# Patient Record
Sex: FEMALE | Race: WHITE | NOT HISPANIC OR LATINO | Employment: STUDENT | URBAN - METROPOLITAN AREA
[De-identification: names, ages, dates, MRNs, and addresses within clinical notes are randomized per-mention and may not be internally consistent; named-entity substitution may affect disease eponyms.]

---
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
## 2020-12-22 ENCOUNTER — CLINICAL SUPPORT (OUTPATIENT)
Dept: URGENT CARE | Facility: CLINIC | Age: 20
End: 2020-12-22
Payer: COMMERCIAL

## 2020-12-22 DIAGNOSIS — Z11.59 ENCOUNTER FOR SCREENING FOR OTHER VIRAL DISEASES: Primary | ICD-10-CM

## 2020-12-22 LAB
CTP QC/QA: YES
SARS-COV-2 RDRP RESP QL NAA+PROBE: NEGATIVE

## 2020-12-22 PROCEDURE — U0002 COVID-19 LAB TEST NON-CDC: HCPCS | Mod: QW,S$GLB,, | Performed by: PHYSICIAN ASSISTANT

## 2020-12-22 PROCEDURE — U0002: ICD-10-PCS | Mod: QW,S$GLB,, | Performed by: PHYSICIAN ASSISTANT

## 2021-03-26 ENCOUNTER — IMMUNIZATION (OUTPATIENT)
Dept: INTERNAL MEDICINE | Facility: CLINIC | Age: 21
End: 2021-03-26
Payer: COMMERCIAL

## 2021-03-26 DIAGNOSIS — Z23 NEED FOR VACCINATION: Primary | ICD-10-CM

## 2021-03-26 PROCEDURE — 91300 COVID-19, MRNA, LNP-S, PF, 30 MCG/0.3 ML DOSE VACCINE: CPT | Mod: PBBFAC | Performed by: FAMILY MEDICINE

## 2021-04-16 ENCOUNTER — IMMUNIZATION (OUTPATIENT)
Dept: INTERNAL MEDICINE | Facility: CLINIC | Age: 21
End: 2021-04-16
Payer: COMMERCIAL

## 2021-04-16 DIAGNOSIS — Z23 NEED FOR VACCINATION: Primary | ICD-10-CM

## 2021-04-16 PROCEDURE — 0002A COVID-19, MRNA, LNP-S, PF, 30 MCG/0.3 ML DOSE VACCINE: CPT | Mod: PBBFAC | Performed by: FAMILY MEDICINE

## 2021-04-16 PROCEDURE — 91300 COVID-19, MRNA, LNP-S, PF, 30 MCG/0.3 ML DOSE VACCINE: CPT | Mod: PBBFAC | Performed by: FAMILY MEDICINE

## 2024-06-28 ENCOUNTER — LAB VISIT (OUTPATIENT)
Dept: LAB | Facility: HOSPITAL | Age: 24
End: 2024-06-28
Payer: COMMERCIAL

## 2024-06-28 ENCOUNTER — OFFICE VISIT (OUTPATIENT)
Dept: INTERNAL MEDICINE | Facility: CLINIC | Age: 24
End: 2024-06-28
Payer: COMMERCIAL

## 2024-06-28 VITALS
DIASTOLIC BLOOD PRESSURE: 70 MMHG | SYSTOLIC BLOOD PRESSURE: 98 MMHG | OXYGEN SATURATION: 97 % | TEMPERATURE: 99 F | WEIGHT: 177.56 LBS | HEIGHT: 66 IN | HEART RATE: 68 BPM | RESPIRATION RATE: 18 BRPM | BODY MASS INDEX: 28.54 KG/M2

## 2024-06-28 DIAGNOSIS — Z11.59 ENCOUNTER FOR HEPATITIS C SCREENING TEST FOR LOW RISK PATIENT: ICD-10-CM

## 2024-06-28 DIAGNOSIS — Z13.220 SCREENING CHOLESTEROL LEVEL: ICD-10-CM

## 2024-06-28 DIAGNOSIS — Z00.00 ENCOUNTER FOR HEALTH MAINTENANCE EXAMINATION: Primary | ICD-10-CM

## 2024-06-28 DIAGNOSIS — Z11.4 ENCOUNTER FOR SCREENING FOR HIV: ICD-10-CM

## 2024-06-28 DIAGNOSIS — Z01.89 ROUTINE LAB DRAW: ICD-10-CM

## 2024-06-28 DIAGNOSIS — R26.9 GAIT DISTURBANCE: ICD-10-CM

## 2024-06-28 DIAGNOSIS — Z23 NEED FOR TDAP VACCINATION: ICD-10-CM

## 2024-06-28 DIAGNOSIS — Z01.419 PAP TEST, AS PART OF ROUTINE GYNECOLOGICAL EXAMINATION: ICD-10-CM

## 2024-06-28 LAB
ALBUMIN SERPL BCP-MCNC: 3.9 G/DL (ref 3.5–5.2)
ALP SERPL-CCNC: 58 U/L (ref 55–135)
ALT SERPL W/O P-5'-P-CCNC: 23 U/L (ref 10–44)
ANION GAP SERPL CALC-SCNC: 8 MMOL/L (ref 8–16)
AST SERPL-CCNC: 16 U/L (ref 10–40)
BASOPHILS # BLD AUTO: 0.06 K/UL (ref 0–0.2)
BASOPHILS NFR BLD: 0.7 % (ref 0–1.9)
BILIRUB SERPL-MCNC: 0.7 MG/DL (ref 0.1–1)
BUN SERPL-MCNC: 11 MG/DL (ref 6–20)
CALCIUM SERPL-MCNC: 9.6 MG/DL (ref 8.7–10.5)
CHLORIDE SERPL-SCNC: 106 MMOL/L (ref 95–110)
CHOLEST SERPL-MCNC: 161 MG/DL (ref 120–199)
CHOLEST/HDLC SERPL: 3.3 {RATIO} (ref 2–5)
CO2 SERPL-SCNC: 23 MMOL/L (ref 23–29)
CREAT SERPL-MCNC: 0.7 MG/DL (ref 0.5–1.4)
DIFFERENTIAL METHOD BLD: NORMAL
EOSINOPHIL # BLD AUTO: 0.2 K/UL (ref 0–0.5)
EOSINOPHIL NFR BLD: 1.9 % (ref 0–8)
ERYTHROCYTE [DISTWIDTH] IN BLOOD BY AUTOMATED COUNT: 13.6 % (ref 11.5–14.5)
EST. GFR  (NO RACE VARIABLE): >60 ML/MIN/1.73 M^2
GLUCOSE SERPL-MCNC: 83 MG/DL (ref 70–110)
HCT VFR BLD AUTO: 40.3 % (ref 37–48.5)
HCV AB SERPL QL IA: NORMAL
HDLC SERPL-MCNC: 49 MG/DL (ref 40–75)
HDLC SERPL: 30.4 % (ref 20–50)
HGB BLD-MCNC: 13.6 G/DL (ref 12–16)
HIV 1+2 AB+HIV1 P24 AG SERPL QL IA: NORMAL
IMM GRANULOCYTES # BLD AUTO: 0.02 K/UL (ref 0–0.04)
IMM GRANULOCYTES NFR BLD AUTO: 0.2 % (ref 0–0.5)
LDLC SERPL CALC-MCNC: 96.8 MG/DL (ref 63–159)
LYMPHOCYTES # BLD AUTO: 2.2 K/UL (ref 1–4.8)
LYMPHOCYTES NFR BLD: 25.6 % (ref 18–48)
MCH RBC QN AUTO: 29.9 PG (ref 27–31)
MCHC RBC AUTO-ENTMCNC: 33.7 G/DL (ref 32–36)
MCV RBC AUTO: 89 FL (ref 82–98)
MONOCYTES # BLD AUTO: 0.6 K/UL (ref 0.3–1)
MONOCYTES NFR BLD: 7.4 % (ref 4–15)
NEUTROPHILS # BLD AUTO: 5.4 K/UL (ref 1.8–7.7)
NEUTROPHILS NFR BLD: 64.2 % (ref 38–73)
NONHDLC SERPL-MCNC: 112 MG/DL
NRBC BLD-RTO: 0 /100 WBC
PLATELET # BLD AUTO: 256 K/UL (ref 150–450)
PMV BLD AUTO: 11.5 FL (ref 9.2–12.9)
POTASSIUM SERPL-SCNC: 4 MMOL/L (ref 3.5–5.1)
PROT SERPL-MCNC: 7.1 G/DL (ref 6–8.4)
RBC # BLD AUTO: 4.55 M/UL (ref 4–5.4)
SODIUM SERPL-SCNC: 137 MMOL/L (ref 136–145)
TRIGL SERPL-MCNC: 76 MG/DL (ref 30–150)
TSH SERPL DL<=0.005 MIU/L-ACNC: 1.56 UIU/ML (ref 0.4–4)
WBC # BLD AUTO: 8.47 K/UL (ref 3.9–12.7)

## 2024-06-28 PROCEDURE — 80061 LIPID PANEL: CPT | Performed by: NURSE PRACTITIONER

## 2024-06-28 PROCEDURE — 99999 PR PBB SHADOW E&M-NEW PATIENT-LVL V: CPT | Mod: PBBFAC,,, | Performed by: NURSE PRACTITIONER

## 2024-06-28 PROCEDURE — 86803 HEPATITIS C AB TEST: CPT | Performed by: NURSE PRACTITIONER

## 2024-06-28 PROCEDURE — 36415 COLL VENOUS BLD VENIPUNCTURE: CPT | Performed by: NURSE PRACTITIONER

## 2024-06-28 PROCEDURE — 80053 COMPREHEN METABOLIC PANEL: CPT | Performed by: NURSE PRACTITIONER

## 2024-06-28 PROCEDURE — 85025 COMPLETE CBC W/AUTO DIFF WBC: CPT | Performed by: NURSE PRACTITIONER

## 2024-06-28 PROCEDURE — 84443 ASSAY THYROID STIM HORMONE: CPT | Performed by: NURSE PRACTITIONER

## 2024-06-28 PROCEDURE — 87389 HIV-1 AG W/HIV-1&-2 AB AG IA: CPT | Performed by: NURSE PRACTITIONER

## 2024-06-28 NOTE — PROGRESS NOTES
Subjective     Patient ID: Latosha Nagy is a 23 y.o. female.    Chief Complaint: Annual Exam    Pt new to me, here for annual to establish care.    Previous PCP unknown    Review of Systems   Constitutional:  Negative for activity change, appetite change and unexpected weight change.   HENT:  Negative for dental problem and hearing loss.    Eyes:  Negative for visual disturbance.   Respiratory:  Negative for apnea, cough, chest tightness and shortness of breath.    Cardiovascular:  Negative for chest pain, palpitations and leg swelling.   Gastrointestinal:  Negative for abdominal distention, abdominal pain, anal bleeding, blood in stool, constipation, diarrhea, nausea, rectal pain and vomiting.   Endocrine: Negative for cold intolerance, heat intolerance, polydipsia, polyphagia and polyuria.   Genitourinary:  Negative for difficulty urinating, hematuria, menstrual problem, pelvic pain and vaginal pain.   Musculoskeletal:  Positive for gait problem. Negative for arthralgias.        Happened when she was working on her feet long periods of time without supportive shoes. States she use to over twist her foot in and her ankles would buckle and feel weak. Has improved since changing jobs however sometimes her gait is feeling off at times.   Integumentary:  Negative for color change.   Allergic/Immunologic: Negative for environmental allergies, food allergies and immunocompromised state.   Neurological:  Positive for numbness. Negative for dizziness, speech difficulty, weakness, light-headedness and headaches.        Happened when she was working on her feet long periods of time without supportive shoes. States she use to over twist her foot in and her ankles would buckle and feel weak. Has improved since changing jobs however sometimes her gait is feeling off at times.   Hematological:  Negative for adenopathy. Does not bruise/bleed easily.   Psychiatric/Behavioral:  Negative for agitation, behavioral problems, sleep  "disturbance and suicidal ideas.      Review of patient's allergies indicates:  No Known Allergies    No current outpatient medications on file.    There is no problem list on file for this patient.    History reviewed. No pertinent past medical history.    History reviewed. No pertinent surgical history.    Social History     Socioeconomic History    Marital status: Single   Tobacco Use    Smoking status: Never   Substance and Sexual Activity    Alcohol use: No    Drug use: No    Sexual activity: Never     Social Determinants of Health     Financial Resource Strain: Low Risk  (6/28/2024)    Overall Financial Resource Strain (CARDIA)     Difficulty of Paying Living Expenses: Not very hard   Food Insecurity: No Food Insecurity (6/28/2024)    Hunger Vital Sign     Worried About Running Out of Food in the Last Year: Never true     Ran Out of Food in the Last Year: Never true   Physical Activity: Insufficiently Active (6/28/2024)    Exercise Vital Sign     Days of Exercise per Week: 3 days     Minutes of Exercise per Session: 30 min   Stress: No Stress Concern Present (6/28/2024)    [employer] [place] [employer] - Occupational Stress Questionnaire     Feeling of Stress : Only a little   Housing Stability: Unknown (6/28/2024)    Housing Stability Vital Sign     Unable to Pay for Housing in the Last Year: No       Family History   Problem Relation Name Age of Onset    Diabetes Mother Jackie Foy     Hypertension Mother Jackie Foy     Thyroid disease Mother Jackie Foy     Early death Mother Jackie Foy         heart attack - age 55    No Known Problems Father      Breast cancer Neg Hx      Colon cancer Neg Hx      Ovarian cancer Neg Hx         Objective     Vitals:    06/28/24 0833   BP: 98/70   Pulse: 68   Resp: 18   Temp: 98.6 °F (37 °C)   TempSrc: Oral   SpO2: 97%   Weight: 80.6 kg (177 lb 9.3 oz)   Height: 5' 6" (1.676 m)   PainSc: 0-No pain     Body mass index is 28.66 kg/m².    Physical Exam  Vitals " and nursing note reviewed.   Constitutional:       Appearance: Normal appearance. She is well-developed.   HENT:      Head: Normocephalic.      Right Ear: Hearing, tympanic membrane, ear canal and external ear normal.      Left Ear: Hearing, tympanic membrane, ear canal and external ear normal.      Nose: Nose normal.      Mouth/Throat:      Mouth: Mucous membranes are moist.      Pharynx: Oropharynx is clear.   Eyes:      General: Lids are normal. Lids are everted, no foreign bodies appreciated.      Extraocular Movements: Extraocular movements intact.      Conjunctiva/sclera: Conjunctivae normal.      Pupils: Pupils are equal, round, and reactive to light.   Neck:      Vascular: No carotid bruit or JVD.      Trachea: Trachea normal.   Cardiovascular:      Rate and Rhythm: Normal rate and regular rhythm.      Pulses: Normal pulses.      Heart sounds: Normal heart sounds, S1 normal and S2 normal.   Pulmonary:      Effort: Pulmonary effort is normal.      Breath sounds: Normal breath sounds.   Abdominal:      General: Abdomen is flat. Bowel sounds are normal.      Palpations: Abdomen is soft.   Musculoskeletal:         General: Normal range of motion.      Cervical back: Full passive range of motion without pain, normal range of motion and neck supple.   Skin:     General: Skin is warm and dry.      Capillary Refill: Capillary refill takes less than 2 seconds.   Neurological:      General: No focal deficit present.      Mental Status: She is alert and oriented to person, place, and time.      Deep Tendon Reflexes: Reflexes are normal and symmetric.   Psychiatric:         Mood and Affect: Mood normal.         Speech: Speech normal.         Behavior: Behavior normal.         Thought Content: Thought content normal.         Judgment: Judgment normal.            Assessment and Plan     1. Encounter for health maintenance examination  Annual wellness exam completed.    All medications, histories, and concerns reviewed,  reconciled, and addressed.    Appropriate Screenings per pt's sex and age have been reviewed and discussed with pt.    BMI reviewed.    2. Routine lab draw  -     CBC Auto Differential; Future; Expected date: 06/28/2024  -     Comprehensive Metabolic Panel; Future; Expected date: 06/28/2024  -     Lipid Panel; Future; Expected date: 06/28/2024  -     TSH; Future; Expected date: 06/28/2024  -     Hepatitis C Antibody; Future; Expected date: 06/28/2024  -     HIV 1/2 Ag/Ab (4th Gen); Future; Expected date: 06/28/2024    3. Screening cholesterol level  -     Lipid Panel; Future; Expected date: 06/28/2024    4. Encounter for screening for HIV  -     HIV 1/2 Ag/Ab (4th Gen); Future; Expected date: 06/28/2024    5. Encounter for hepatitis C screening test for low risk patient  -     Hepatitis C Antibody; Future; Expected date: 06/28/2024    6. Need for Tdap vaccination  -     DIPH,PERTUSS(ACEL),TET VAC(PF)(ADULT)(ADACEL)(TDaP)    7. Pap test, as part of routine gynecological examination  Has scheduled with Dr. Santoyo     8. BMI 28.0-28.9,adult  BMI reviewed    9. Gait disturbance  -     Ambulatory referral/consult to Neurology; Future; Expected date: 06/28/2024    Fasting lab orders, will call with results, if results ok, RTC in 1 yr for annual or sooner prn with one of MDs I work with who can be your new PCP: Dr. Kyle Shaikh is my practice partner    Keep appt with GYN as scheduled for pap smear    Tdap vaccine discussed    Neurology referral for eval of issue with ankles and walking           Follow up in about 1 year (around 6/28/2025) for annual or sooner as needed with my practice partner Dr. Shaikh.

## 2024-06-28 NOTE — PATIENT INSTRUCTIONS
Fasting lab orders, will call with results, if results ok, RTC in 1 yr for annual or sooner prn with one of MDs I work with who can be your new PCP: Dr. Kyle Shaikh is my practice partner    Keep appt with GYN as scheduled for pap smear    Tdap vaccine discussed    Neurology referral for eval of issue with ankles and walking

## 2025-04-24 ENCOUNTER — TELEPHONE (OUTPATIENT)
Dept: INTERNAL MEDICINE | Facility: CLINIC | Age: 25
End: 2025-04-24
Payer: COMMERCIAL

## 2025-04-24 NOTE — TELEPHONE ENCOUNTER
----- Message from Alla sent at 4/24/2025 11:34 AM CDT -----  Regarding: TIFFANY DREW [[id number]]  Type : Patient Call  Who Called :TIFFANY DREW [[id number]]  Does the patient know what this is regarding?:patient called and stated that she would like to receive a call back in regard to getting a Tb skin test for school, please follow up and advise whether or not this can be provided as patient has been scheduled for tomorrow 04/25/25 at 1:00 pm to also establish care  as her previous primary care  physician and NP are no longer with [facility]. Thanks!  Would the patient rather a call back or a response via [facility]?Call back    Best Call Back Number:[phone]  Additional Information:  ----- Message -----  From: Alla Rizzo  Sent: 4/24/2025  12:01 PM CDT  To: Rolan Bush Staff  Subject: TIFFANY DREW [[id number]]                        Type : Patient Call  Who Called :TIFFANY DREW [[id number]]  Does the patient know what this is regarding?:patient called and stated that she would like to receive a call back in regard to getting a Tb skin test for school, please follow up and advise whether or not this can be provided as patient has been scheduled for tomorrow 04/25/25 at 1:00 pm to also establish care  as her previous primary care  physician and NP is no longer with [facility]. Thanks!  Would the patient rather a call back or a response via [facility]?Call back    Best Call Back Number:[phone]  Additional Information:  ----- Message -----  From: Alla Rizzo  Sent: 4/24/2025  12:01 PM CDT  To: Rolan Bush Staff  Subject: TIFFANY DREW [[id number]]                        Type : Patient Call  Who Called :TIFFANY DREW [[id number]]  Does the patient know what this is regarding?:patient called and stated that she would like to receive a call back in regard to getting a Tb skin test for school, please follow up and advise whether or not this can be provided as patient has been scheduled for tomorrow at 1:00 pm to  also establish care  as her previous primary care  physician and NP is no longer with [facility]. Thanks!  Would the patient rather a call back or a response via [facility]?Call back    Best Call Back Number:[phone]  Additional Information:  ----- Message -----  From: Alla Rizzo  Sent: 4/24/2025  11:58 AM CDT  To: Rolan Bush Staff  Subject: TIFFANY DREW [[id number]]                        Type : Patient Call  Who Called :TIFFANY DREW [[id number]]  Does the patient know what this is regarding?:patient called and stated that she would like to receive a call back in regard to getting a Tb skin test for school, please follow up and advise whether or not this can be provided as patient has been scheduled for tomorrow at 1:00 pm to also establish care  as her previous primary care  physician and NP is no longer with [facility]. Thanks!  Would the patient rather a call back or a response via [facility]?Call back    Best Call Back Number:[phone]  Additional Information:

## 2025-04-25 ENCOUNTER — LAB VISIT (OUTPATIENT)
Dept: LAB | Facility: OTHER | Age: 25
End: 2025-04-25
Attending: INTERNAL MEDICINE
Payer: COMMERCIAL

## 2025-04-25 ENCOUNTER — OFFICE VISIT (OUTPATIENT)
Dept: INTERNAL MEDICINE | Facility: CLINIC | Age: 25
End: 2025-04-25
Payer: COMMERCIAL

## 2025-04-25 VITALS
SYSTOLIC BLOOD PRESSURE: 116 MMHG | DIASTOLIC BLOOD PRESSURE: 59 MMHG | WEIGHT: 187.81 LBS | BODY MASS INDEX: 30.18 KG/M2 | HEART RATE: 79 BPM | HEIGHT: 66 IN

## 2025-04-25 DIAGNOSIS — Z13.6 SCREENING FOR CARDIOVASCULAR CONDITION: ICD-10-CM

## 2025-04-25 DIAGNOSIS — Z13.1 SCREENING FOR DIABETES MELLITUS (DM): ICD-10-CM

## 2025-04-25 DIAGNOSIS — Z11.1 SCREENING FOR TUBERCULOSIS: ICD-10-CM

## 2025-04-25 DIAGNOSIS — E66.811 OBESITY (BMI 30.0-34.9): ICD-10-CM

## 2025-04-25 DIAGNOSIS — Z00.00 ENCOUNTER FOR ROUTINE ADULT HEALTH EXAMINATION WITHOUT ABNORMAL FINDINGS: ICD-10-CM

## 2025-04-25 DIAGNOSIS — Z00.00 ENCOUNTER FOR ROUTINE ADULT HEALTH EXAMINATION WITHOUT ABNORMAL FINDINGS: Primary | ICD-10-CM

## 2025-04-25 LAB
ABSOLUTE EOSINOPHIL (OHS): 0.07 K/UL
ABSOLUTE MONOCYTE (OHS): 0.71 K/UL (ref 0.3–1)
ABSOLUTE NEUTROPHIL COUNT (OHS): 7.57 K/UL (ref 1.8–7.7)
ALBUMIN SERPL BCP-MCNC: 4.2 G/DL (ref 3.5–5.2)
ALP SERPL-CCNC: 74 UNIT/L (ref 40–150)
ALT SERPL W/O P-5'-P-CCNC: 21 UNIT/L (ref 10–44)
ANION GAP (OHS): 10 MMOL/L (ref 8–16)
AST SERPL-CCNC: 17 UNIT/L (ref 11–45)
BASOPHILS # BLD AUTO: 0.06 K/UL
BASOPHILS NFR BLD AUTO: 0.6 %
BILIRUB SERPL-MCNC: 0.4 MG/DL (ref 0.1–1)
BUN SERPL-MCNC: 12 MG/DL (ref 6–20)
CALCIUM SERPL-MCNC: 9.6 MG/DL (ref 8.7–10.5)
CHLORIDE SERPL-SCNC: 107 MMOL/L (ref 95–110)
CHOLEST SERPL-MCNC: 195 MG/DL (ref 120–199)
CHOLEST/HDLC SERPL: 3 {RATIO} (ref 2–5)
CO2 SERPL-SCNC: 23 MMOL/L (ref 23–29)
CREAT SERPL-MCNC: 0.7 MG/DL (ref 0.5–1.4)
EAG (OHS): 100 MG/DL (ref 68–131)
ERYTHROCYTE [DISTWIDTH] IN BLOOD BY AUTOMATED COUNT: 12.5 % (ref 11.5–14.5)
GFR SERPLBLD CREATININE-BSD FMLA CKD-EPI: >60 ML/MIN/1.73/M2
GLUCOSE SERPL-MCNC: 89 MG/DL (ref 70–110)
HBA1C MFR BLD: 5.1 % (ref 4–5.6)
HCT VFR BLD AUTO: 40.3 % (ref 37–48.5)
HDLC SERPL-MCNC: 65 MG/DL (ref 40–75)
HDLC SERPL: 33.3 % (ref 20–50)
HGB BLD-MCNC: 12.6 GM/DL (ref 12–16)
IMM GRANULOCYTES # BLD AUTO: 0.04 K/UL (ref 0–0.04)
IMM GRANULOCYTES NFR BLD AUTO: 0.4 % (ref 0–0.5)
LDLC SERPL CALC-MCNC: 118.6 MG/DL (ref 63–159)
LYMPHOCYTES # BLD AUTO: 1.78 K/UL (ref 1–4.8)
MCH RBC QN AUTO: 26.9 PG (ref 27–31)
MCHC RBC AUTO-ENTMCNC: 31.3 G/DL (ref 32–36)
MCV RBC AUTO: 86 FL (ref 82–98)
NONHDLC SERPL-MCNC: 130 MG/DL
NUCLEATED RBC (/100WBC) (OHS): 0 /100 WBC
PLATELET # BLD AUTO: 325 K/UL (ref 150–450)
PMV BLD AUTO: 11.1 FL (ref 9.2–12.9)
POTASSIUM SERPL-SCNC: 4.8 MMOL/L (ref 3.5–5.1)
PROT SERPL-MCNC: 8 GM/DL (ref 6–8.4)
RBC # BLD AUTO: 4.69 M/UL (ref 4–5.4)
RELATIVE EOSINOPHIL (OHS): 0.7 %
RELATIVE LYMPHOCYTE (OHS): 17.4 % (ref 18–48)
RELATIVE MONOCYTE (OHS): 6.9 % (ref 4–15)
RELATIVE NEUTROPHIL (OHS): 74 % (ref 38–73)
SODIUM SERPL-SCNC: 140 MMOL/L (ref 136–145)
TRIGL SERPL-MCNC: 57 MG/DL (ref 30–150)
WBC # BLD AUTO: 10.23 K/UL (ref 3.9–12.7)

## 2025-04-25 PROCEDURE — 86735 MUMPS ANTIBODY: CPT

## 2025-04-25 PROCEDURE — 80061 LIPID PANEL: CPT

## 2025-04-25 PROCEDURE — 99999 PR PBB SHADOW E&M-EST. PATIENT-LVL III: CPT | Mod: PBBFAC,,, | Performed by: INTERNAL MEDICINE

## 2025-04-25 PROCEDURE — 80053 COMPREHEN METABOLIC PANEL: CPT

## 2025-04-25 PROCEDURE — 86480 TB TEST CELL IMMUN MEASURE: CPT

## 2025-04-25 PROCEDURE — 85025 COMPLETE CBC W/AUTO DIFF WBC: CPT

## 2025-04-25 PROCEDURE — 36415 COLL VENOUS BLD VENIPUNCTURE: CPT

## 2025-04-25 PROCEDURE — 83036 HEMOGLOBIN GLYCOSYLATED A1C: CPT

## 2025-04-25 PROCEDURE — 86787 VARICELLA-ZOSTER ANTIBODY: CPT

## 2025-04-25 NOTE — PROGRESS NOTES
Subjective:      Patient ID: Latosha Nagy is a 24 y.o. female.    Chief Complaint: Establish Care      History of Present Illness    CHIEF COMPLAINT:  Patient presents today to establish care and obtain required documentation for school.    The patient is a 24-year-old  woman presenting to establish primary care, undergo an annual wellness examination, and request varicella and MMR titers, as well as a TB test, for graduate school application requirements.  Objective:  She is a healthy individual who underwent an annual wellness examination in . Laboratory studies at that time--including blood glucose, lipid panel, liver and renal function tests, TSH, WBC, platelet count, and RBC--were all within normal limits.  She is currently single, lives alone, and has no children. She is employed full-time and reports no difficulties at work. She denies experiencing anxiety, depression, stress, or insomnia.  This is her first visit with me. I have reviewed her previous medical records and laboratory results today, which took approximately five minutes.      MEDICAL HISTORY:  Laboratory tests from  of previous year showed normal liver and kidney function, normal cholesterol levels, negative STD screening, normal thyroid function, and no anemia.    IMMUNIZATIONS:  She has received multiple MMR vaccinations with the most recent in  and completed Varicella vaccination with adequate titers. She has not received meningitis vaccination.    FAMILY HISTORY:  Her mother  from a heart attack at an early age and was a smoker. Grandfather had skin cancer. Both grandmother and mother underwent thyroid removal procedures. She denies family history of stroke, lung cancer, colon cancer, or breast cancer.    SOCIAL HISTORY:  She is single and not . She denies smoking but reports occasional alcohol consumption and marijuana use.    MEDICATIONS:  She is not currently taking any prescribed  "medications.    ALLERGIES:  She denies any medication or food allergies.      ROS:  General: -fever, -chills, -fatigue, -weight gain, -weight loss, +increased substance use  Eyes: -vision changes, -redness, -discharge  ENT: -ear pain, -nasal congestion, -sore throat  Cardiovascular: -chest pain, -palpitations, -lower extremity edema  Respiratory: -cough, -shortness of breath  Gastrointestinal: -abdominal pain, -nausea, -vomiting, -diarrhea, -constipation, -blood in stool  Genitourinary: -dysuria, -hematuria, -frequency  Musculoskeletal: -joint pain, -muscle pain  Skin: -rash, -lesion  Neurological: -headache, -dizziness, -numbness, -tingling  Psychiatric: -anxiety, -depression, -sleep difficulty         There are no active problems to display for this patient.       MEDICATIONS:  Current Medications[1]  No current outpatient medications on file.   ALLERGIES:  Review of patient's allergies indicates:  No Known Allergies     History reviewed. No pertinent past medical history.  History reviewed. No pertinent surgical history.  Social History     Social History Narrative    Not on file     Family History   Problem Relation Name Age of Onset    Diabetes Mother Jackie Foy     Hypertension Mother Jackie Foy     Thyroid disease Mother Jackie Foy     Early death Mother Jackie Foy         heart attack - age 55    No Known Problems Father      Breast cancer Neg Hx      Colon cancer Neg Hx      Ovarian cancer Neg Hx         Vitals:    04/25/25 1305   BP: (!) 116/59   Pulse: 79   Weight: 85.2 kg (187 lb 13.3 oz)   Height: 5' 6" (1.676 m)   PainSc: 0-No pain       Review of Systems     No results found for: "HGBA1C"  No results found for: "MICALBCREAT"  Lab Results   Component Value Date    LDLCALC 96.8 06/28/2024    LDLCALC 115.0 10/26/2012    CHOL 161 06/28/2024    HDL 49 06/28/2024    TRIG 76 06/28/2024       Lab Results   Component Value Date     06/28/2024    K 4.0 06/28/2024     06/28/2024    CO2 23 " "06/28/2024    GLU 83 06/28/2024    BUN 11 06/28/2024    CREATININE 0.7 06/28/2024    CALCIUM 9.6 06/28/2024    PROT 7.1 06/28/2024    ALBUMIN 3.9 06/28/2024    BILITOT 0.7 06/28/2024    ALKPHOS 58 06/28/2024    AST 16 06/28/2024    ALT 23 06/28/2024    ANIONGAP 8 06/28/2024    WBC 8.47 06/28/2024    HGB 13.6 06/28/2024    HGB 13.6 10/25/2012    HCT 40.3 06/28/2024    MCV 89 06/28/2024     06/28/2024    TSH 1.556 06/28/2024    HEPCAB Non-reactive 06/28/2024       No results found for: "LH", "FSH", "TOTALTESTOST", "PROGESTERONE", "ESTRADIOL", "RMPDNPSK56HR", "[id number]", "FERRITIN", "IRON", "TRANSFERRIN", "TIBC", "FESATURATED", "ZINC"    Objective:   Physical Exam   Physical Exam    General: No acute distress. Well-developed. Well-nourished.+ obesity  Eyes: EOMI. Sclerae anicteric.  HENT: Normocephalic. Atraumatic. Nares patent. Moist oral mucosa.  Ears: Bilateral TMs clear. Bilateral EACs clear.  Cardiovascular: Regular rate. Regular rhythm. No murmurs. No rubs. No gallops. Normal S1, S2.  Respiratory: Normal respiratory effort. Clear to auscultation bilaterally. No rales. No rhonchi. No wheezing.  Abdomen: Soft. Non-tender. Non-distended. Normoactive bowel sounds.  Musculoskeletal: No  obvious deformity.  Extremities: No lower extremity edema.  Neurological: Alert & oriented x3. No slurred speech. Normal gait.  Psychiatric: Normal mood. Normal affect. Good insight. Good judgment.  Skin: Warm. Dry. No rash.         Assessment:     1. Encounter for routine adult health examination without abnormal findings    2. Obesity (BMI 30.0-34.9)    3. Screening for diabetes mellitus (DM)    4. Screening for cardiovascular condition    5. Screening for tuberculosis      Plan:   Assessment & Plan   Encounter for routine adult health examination without abnormal findings  -     CBC Auto Differential; Future; Expected date: 04/25/2025  -     Comprehensive Metabolic Panel; Future; Expected date: 04/25/2025  -     Lipid " Panel; Future; Expected date: 04/25/2025  -     Hemoglobin A1C; Future; Expected date: 04/25/2025  -     Cancel: Rubella antibody, IgG; Future; Expected date: 04/25/2025  -     Cancel: Rubeola antibody IgG; Future; Expected date: 04/25/2025  -     Mumps, IgG Screen; Future; Expected date: 04/25/2025  -     Varicella zoster antibody, IgG; Future; Expected date: 04/25/2025  -     Measles Antibodies (IgG, IgM); Future; Expected date: 04/25/2025  -     Rubella antibody, IgG; Future; Expected date: 04/25/2025  -     Rubeola antibody IgG; Future; Expected date: 04/25/2025  -     Mumps, IgG Screen; Future; Expected date: 04/25/2025  -     Varicella zoster antibody, IgG; Future; Expected date: 04/25/2025    Obesity (BMI 30.0-34.9)  - educated counseled patient today and advised the patient to have healthy lifestyle modification for overweight reduction such as taking low-calorie diet to do exercise as tolerated daily  Screening for diabetes mellitus (DM)  -     Hemoglobin A1C; Future; Expected date: 04/25/2025    Screening for cardiovascular condition  -     Lipid Panel; Future; Expected date: 04/25/2025    Screening for tuberculosis  -     Quantiferon Gold TB; Future; Expected date: 04/25/2025       Assessment & Plan    E66.3 Overweight  F12.90 Cannabis use, unspecified, uncomplicated  Z28.39 Other underimmunization status  Z82.49 Family history of ischemic heart disease and other diseases of the circulatory system  Z80.8 Family history of malignant neoplasm of other organs or systems  Z83.49 Family history of other endocrine, nutritional and metabolic diseases  Z72.89 Other problems related to lifestyle    IMPRESSION:  Reviewed previous lab work from June 2021, noting normal results for STD screening, thyroid, cholesterol, liver function, renal function, and anemia.  Reviewed family history, noting maternal history of early heart attack and thyroid issues.  Assessed current health status, including weight and lifestyle  factors.    OVERWEIGHT:  - Noted the patient to be overweight.  - Acknowledged the patient's overweight status.  - Encouraged the patient to continue with plans for exercise to address slight overweight status.  - Commended the patient's efforts in working on weight management through exercise.  - Patient to continue with plans for exercise to address slight overweight status.    CANNABIS USE:  - Noted the patient's report of occasional marijuana use.    IMMUNIZATION STATUS:  - Reviewed the patient's vaccination records, noting some vaccinations are up to date while others are missing.  - Evaluated the patient's immunization status, identifying missing meningitis vaccination.  - Assessed the need for titer checks and additional vaccinations.  - Explained the purpose of titer checks to determine antibody levels and vaccine effectiveness.  - Discussed the difference between regular wellness labs and specific titer checks.  - Explained the rationale behind meningitis vaccination requirements for certain situations.  - Ordered labs to check titers for MMR, Varicella, and Hepatitis B.  - Advised the patient to contact their insurance company to inquire about coverage for specialized tests, particularly meningitis titer.  - Ordered tuberculosis blood test (chosen over skin test based on availability and efficiency).  - Instructed to follow up in 72 hours to read TB test results if skin test is performed.  - Planned to administer meningitis vaccination and check titers for other vaccinations.  - Provided prescription for meningitis vaccination to be administered at pharmacy.    FAMILY HISTORY OF HEART DISEASE:  - Noted the patient's report of family history of heart attack.  - Acknowledged family history of heart disease.    FAMILY HISTORY OF SKIN CANCER:  - Noted the patient's report of possible family history of skin cancer.    FAMILY HISTORY OF THYROID ISSUES:  - Noted the patient's report of family history of thyroid  "issues.    LIFESTYLE:  - Noted the patient reports no smoking and infrequent alcohol consumption.         Orders Placed This Encounter    CBC Auto Differential    Comprehensive Metabolic Panel    Lipid Panel    Hemoglobin A1C    Quantiferon Gold TB    Mumps, IgG Screen    Varicella zoster antibody, IgG    Measles Antibodies (IgG, IgM)    Rubella antibody, IgG    Rubeola antibody IgG    Mumps, IgG Screen    Varicella zoster antibody, IgG       Follow up in about 1 week (around 5/2/2025).     There are Patient Instructions on file for this visit.  [unfilled]   All of your core healthy metrics are met.       Health risks of obesity   The Basics   Written by the doctors and editors at [facility], [place]   What is obesity? --   Doctors define obesity based on a person's "body mass index," or "BMI." For adults, weight and height are used to calculate BMI (figure 1).  For people who are White, , or Black:   "Overweight" means a BMI between 25 and 29.9.   "Obesity" means a BMI of 30 or greater.  For people who are , the cutoff numbers are a little different:   "Overweight" means a BMI between 23 and 25.   "Obesity" means a BMI of 25 or greater.  In addition to figuring out your BMI, your doctor might also measure around your belly. This is called "waist circumference." Doctors call it "central obesity" when people carry extra weight in the belly area, even if their BMI is normal.  Obesity increases the risks of many different health problems. It can also make it harder for you to move, breathe, and do other activities.  What are the health risks of obesity? --   Having obesity increases a person's risk of developing many health problems. Here are just a few examples:   Diabetes   High blood pressure   High cholesterol   Heart disease (including heart attacks)   Stroke   Sleep apnea (a disorder that makes you stop breathing for short periods while asleep)   Asthma   Cancer  Does having obesity shorten a person's life? " --   Yes. Studies show that:   People with obesity die younger than people who are a healthy weight.   The risk of death goes up the heavier a person is. The degree of increased risk depends on how long the person has had obesity, and on what other medical problems they have.  People with central obesity (extra weight in the belly area) might also be at risk of dying younger.  Can medical treatments help me lose weight? --   Yes. There are medicines and surgery to help with weight loss. These treatments are meant for people who have not been able to lose weight through lifestyle changes such as diet and exercise.  Weight loss treatments do not take the place of diet and exercise. People who have the treatments must also change how they eat and how active they are.  How can I prevent the problems caused by obesity? --   The best thing you can do is lose weight. But even if weight loss is not possible, you can improve your health and lower your risk if you:   Become more active - Many types of physical activity can help, including walking. You can start with a few minutes a day, and add more as you get stronger and build up your endurance. Anything that gets your body moving is good for you. It is easier to create a habit if you choose activities you enjoy.   Improve your diet - It is healthy to have regular meal times, eat smaller portions, and not skip meals. Limit sweets, and avoid processed foods. Try to eat more vegetables and fruits instead. The best weight loss plans help you have a healthy view of eating.   Quit smoking (if you smoke) - Some people start eating more after they stop smoking, so try to make healthy food choices. Even if it increases your appetite, quitting smoking is still one of the best things you can do to improve your health.   Limit alcohol - For females of any age, limit alcohol to no more than 1 drink a day. For males 64 and younger, limit alcohol to no more than 2 drinks a day. For males  65 and older, limit alcohol to no more than 1 drink a day.  Keeping a diary might help you reach your daily goals. You can use a paper diary or francisco to help you record:   What you eat and drink   Your physical activity   Your weight  What causes obesity? --   Your genes affect your risk of obesity. But lifestyle also has a big impact. You can develop obesity if you eat too much, eat unhealthy foods, move too little, and watch a lot of TV.  There are other things that seem to increase the risk of obesity that you might not know about. Here are some:   Mother's habits during and after pregnancy - People who eat a lot of calories, have diabetes, or smoke during pregnancy have a higher chance of having babies who have obesity as adults. Also, babies who drink formula might be more likely than  babies to develop obesity later in life.   Habits and weight gain during childhood - Children or teens who are overweight or have obesity are more likely to have obesity as an adult.   Sleeping too little - People who do not get enough sleep are more likely to develop obesity.   Taking certain medicines - Long-term use of certain medicines can cause weight gain. If you are concerned that one of your medicines might be making you gain weight, talk to your doctor or nurse. They might be able to switch you to a different medicine.   Certain hormonal conditions - Some hormonal problems can increase the risk of developing obesity. For example, hypothyroidism can cause weight gain, along with other symptoms.  What if I want to get pregnant? --   If you are overweight or have obesity, it might be harder to get pregnant. For males, obesity can also cause sex problems, like having trouble getting or keeping an erection. This is more likely if you also have high blood pressure or diabetes.  What if my child has obesity? --   In children, obesity has many of the same risks as it does in adults. For example, it can increase the risk  of diabetes, high blood pressure, asthma, and sleep apnea. It can also cause added problems related to childhood. For example, obesity can make children grow faster than normal and cause girls to go through puberty earlier than usual.  When should I call the doctor? --   Call your doctor or nurse if you want to try to lose weight. They can help you do it in a healthy way.  It can also help to work with a dietitian (food and nutrition expert). They can help you choose healthy foods and plan meals.  All topics are updated as new evidence becomes available and our peer review process is complete.  This topic retrieved from [employer] on: Jul 13, 2024.  Topic 08184 Version 20.0  Release: 32.6.2 - C32.193  © 2024 UpToDate, Inc. and/or its affiliates. All rights reserved.  figure 1: Your body mass index (BMI)     Find your height (in feet and inches) in the top row. Then, find your weight (in pounds) in the first column. Now, find where the column for your height and the row for your weight meet. That is your BMI. For example, if you are 5-feet-9-inches tall and you weigh 260 pounds, your BMI is 38.  Graphic 72820 Version 4.0  Consumer Information Use and Disclaimer   Disclaimer: This generalized information is a limited summary of diagnosis, treatment, and/or medication information. It is not meant to be comprehensive and should be used as a tool to help the user understand and/or assess potential diagnostic and treatment options. It does NOT include all information about conditions, treatments, medications, side effects, or risks that may apply to a specific patient. It is not intended to be medical advice or a substitute for the medical advice, diagnosis, or treatment of a health care provider based on the health care provider's examination and assessment of a patient's specific and unique circumstances. Patients must speak with a health care provider for complete information about their health, medical questions, and  treatment options, including any risks or benefits regarding use of medications. This information does not endorse any treatments or medications as safe, effective, or approved for treating a specific patient. UpToDate, Inc. and its affiliates disclaim any warranty or liability relating to this information or the use thereof.The use of this information is governed by the Terms of Use, available at https://www.[employer].com/en/know/clinical-effectiveness-terms. 2024© UpToDate, Inc. and its affiliates and/or licensors. All rights reserved.  Copyright   © 2024 UpToDate, Inc. and/or its affiliates. All rights reserved.  Visit Checklist (as applicable):  1. Status of new and prior symptoms discussed? yes  2. Imaging reviewed/ ordered as appropriate? yes  3. Lab study reviewed/ ordered as appropriate? yes  4. Plan for work-up and treatment discussed with patient? yes  5. Potential medication side-effects and monitoring plan discussed? yes  6. Review of outside medical records was performed and pertinent details are summarized in the HPI above? yes     Time spent on this encounter: 35 minutes. This includes face to face time and non-face to face time preparing to see the patient (eg, review of tests), obtaining and/or reviewing separately obtained history, documenting clinical information in the electronic or other health record, independently interpreting results (not separately reported) and communicating results to the patient/family/caregiver, or care coordination (not separately reported). Also patient education regarding chronic and acute medical conditions reviewed. Patient handouts given if appropriate.     Each patient to whom he or she provides medical services by telemedicine is:  (1) informed of the relationship between the physician and patient and the respective role of any other health care provider with respect to management of the patient; and (2) notified that he or she may decline to receive medical  services by telemedicine and may withdraw from such care at any time.     This note was generated with the assistance of ambient listening technology. Verbal consent was obtained by the patient and accompanying visitor(s) for the recording of patient appointment to facilitate this note. I attest to having reviewed and edited the generated note for accuracy, though some syntax or spelling errors may persist. Please contact the author of this note for any clarification.       Eleazar Gongora MD  [facility]                             [1] No current outpatient medications on file.

## 2025-04-26 LAB
MITOGEN MINUS NIL (OHS): 0.42
NIL TB SYNCED (OHS): 0
QUANTIFERON GOLD INTERP (OHS): ABNORMAL
TB1 AG MINUS NIL (OHS): 0
TB2 AG MINUS NIL (OHS): 0

## 2025-04-28 ENCOUNTER — RESULTS FOLLOW-UP (OUTPATIENT)
Dept: INTERNAL MEDICINE | Facility: CLINIC | Age: 25
End: 2025-04-28
Payer: COMMERCIAL

## 2025-04-28 ENCOUNTER — TELEPHONE (OUTPATIENT)
Dept: INTERNAL MEDICINE | Facility: CLINIC | Age: 25
End: 2025-04-28
Payer: COMMERCIAL

## 2025-04-28 LAB
MUMPS IGG (OHS): 59.7 AU/ML
MUMPS IGG INTERPRETATION (OHS): POSITIVE
V.ZOSTER IGG INTERP (OHS): NEGATIVE
VARICELLA ZOSTER IGG (OHS): 0.25 S/CO

## 2025-04-28 NOTE — TELEPHONE ENCOUNTER
----- Message from Eleazar Gongora MD sent at 4/28/2025  9:41 AM CDT -----  Please call patient as following for chest x-ray study to rule out active tuberculosis because she has intermittent abnormal results of tuberculosis test.  Thank you very much for your assistance.  Your laboratory study results results look fine in renal function, liver function, glucose, electrolytes, lipid profile, WBC, platelet, red blood cells with normal levels hemoglobin hematocrit.    However you have intermittent abnormal results of tuberculosis test, suggesting that you need to further chest x-ray study to evaluation underlying tuberculosis.  I have ordered chest x-ray study for   your already and please call radiology department for the appointment.  Please feel free to contact us for any questions or concerns.  You do not require any change in current treatment.     Please contact me if you have any additional concerns.    Sincerely,    Eleazar Gongora  ----- Message -----  From: Lab, Background User  Sent: 4/25/2025   2:44 PM CDT  To: Eleazar Gongora MD

## 2025-04-28 NOTE — TELEPHONE ENCOUNTER
Spoke with the patient have her schedule for the Chest Xray on tomorrow @0015 and will see Dr. Gongora @[id number]. Also she was ok with the results from the Blood work and still have question about the Xray.

## 2025-04-29 ENCOUNTER — TELEPHONE (OUTPATIENT)
Dept: INTERNAL MEDICINE | Facility: CLINIC | Age: 25
End: 2025-04-29
Payer: COMMERCIAL

## 2025-04-29 ENCOUNTER — HOSPITAL ENCOUNTER (OUTPATIENT)
Dept: RADIOLOGY | Facility: OTHER | Age: 25
Discharge: HOME | End: 2025-04-29
Attending: INTERNAL MEDICINE
Payer: COMMERCIAL

## 2025-04-29 DIAGNOSIS — Z91.89 AT RISK FOR TUBERCULOSIS: ICD-10-CM

## 2025-04-29 DIAGNOSIS — Z91.89 AT HIGH RISK FOR TUBERCULOSIS INFECTION: Primary | ICD-10-CM

## 2025-04-29 PROCEDURE — 71046 X-RAY EXAM CHEST 2 VIEWS: CPT | Mod: TC,FY

## 2025-04-29 PROCEDURE — 71046 X-RAY EXAM CHEST 2 VIEWS: CPT | Mod: 26,,, | Performed by: RADIOLOGY

## 2025-04-30 ENCOUNTER — OFFICE VISIT (OUTPATIENT)
Dept: INTERNAL MEDICINE | Facility: CLINIC | Age: 25
End: 2025-04-30
Payer: COMMERCIAL

## 2025-04-30 VITALS
WEIGHT: 190.25 LBS | OXYGEN SATURATION: 98 % | SYSTOLIC BLOOD PRESSURE: 98 MMHG | DIASTOLIC BLOOD PRESSURE: 57 MMHG | BODY MASS INDEX: 30.58 KG/M2 | HEIGHT: 66 IN | HEART RATE: 77 BPM

## 2025-04-30 DIAGNOSIS — E66.811 OBESITY (BMI 30.0-34.9): ICD-10-CM

## 2025-04-30 DIAGNOSIS — J30.1 SEASONAL ALLERGIC RHINITIS DUE TO POLLEN: Primary | ICD-10-CM

## 2025-04-30 DIAGNOSIS — R03.1 BORDERLINE LOW BLOOD PRESSURE DETERMINED BY EXAMINATION: ICD-10-CM

## 2025-04-30 PROCEDURE — 99999 PR PBB SHADOW E&M-EST. PATIENT-LVL III: CPT | Mod: PBBFAC,,, | Performed by: INTERNAL MEDICINE

## 2025-04-30 NOTE — PROGRESS NOTES
Subjective:      Patient ID: Latosha Nagy is a 24 y.o. female.    Chief Complaint: Follow-up (Need forms filled out for school. Cxr and labs f/u)      History of Present Illness    CHIEF COMPLAINT:  Patient presents today for follow up of test results    The patient is a 24-year-old  woman who presented for follow-up regarding her annual wellness examination, laboratory results, and documentation required for her graduate school application.  Testing and Immunization Status:  Tuberculosis screening: Quantiferon Gold TB test returned abnormal; however, follow-up chest X-ray was normal.  MMR and Varicella titers:  Mumps antibody is present, indicating immunity from prior vaccination.  Varicella (chickenpox) antibody is also present.  Measles titer is currently pending.  Annual Laboratory Results:  Routine health labs, including diabetes screening, lipid panel, complete blood count, electrolytes, nutritional markers, and liver and kidney function tests, were all within normal limits.  Current Symptoms:  The patient reports a runny nose and dry cough without sputum, wheezing, or shortness of breath. She has a known history of seasonal allergies, which may explain current symptoms.  Plan:  TB forms and general health forms required by her graduate school were completed and provided today.  Will complete the remaining documentation for measles immunity once the pending titer result becomes available.        ALLERGIES:  She denies taking any allergy medications.      ROS:  General: -fever, -chills, -fatigue, -weight gain, -weight loss  Eyes: -vision changes, -redness, -discharge  ENT: -ear pain, -nasal congestion, -sore throat  Cardiovascular: -chest pain, -palpitations, -lower extremity edema  Respiratory: -cough, -shortness of breath  Gastrointestinal: -abdominal pain, -nausea, -vomiting, -diarrhea, -constipation, -blood in stool  Genitourinary: -dysuria, -hematuria, -frequency  Musculoskeletal: -joint pain,  "-muscle pain  Skin: -rash, -lesion, +acne  Neurological: -headache, -dizziness, -numbness, -tingling  Psychiatric: -anxiety, -depression, -sleep difficulty  Allergic: +frequent sneezing, +allergic reactions         There are no active problems to display for this patient.       MEDICATIONS:  Current Medications[1]  No current outpatient medications on file.   ALLERGIES:  Review of patient's allergies indicates:  No Known Allergies     History reviewed. No pertinent past medical history.  History reviewed. No pertinent surgical history.  Social History     Social History Narrative    Not on file     Family History   Problem Relation Name Age of Onset    Diabetes Mother Jackie Foy     Hypertension Mother Jackie Foy     Thyroid disease Mother Jackie Foy     Early death Mother Jackie Foy         heart attack - age 55    No Known Problems Father      Breast cancer Neg Hx      Colon cancer Neg Hx      Ovarian cancer Neg Hx         Vitals:    04/30/25 1003   BP: (!) 98/57   Pulse: 77   SpO2: 98%   Weight: 86.3 kg (190 lb 4.1 oz)   Height: 5' 6" (1.676 m)   PainSc: 0-No pain       Review of Systems     Lab Results   Component Value Date    HGBA1C 5.1 04/25/2025     No results found for: "MICALBCREAT"  Lab Results   Component Value Date    LDLCALC 118.6 04/25/2025    LDLCALC 96.8 06/28/2024    CHOL 195 04/25/2025    HDL 65 04/25/2025    TRIG 57 04/25/2025       Lab Results   Component Value Date     04/25/2025    K 4.8 04/25/2025     04/25/2025    CO2 23 04/25/2025    GLU 89 04/25/2025    BUN 12 04/25/2025    CREATININE 0.7 04/25/2025    CALCIUM 9.6 04/25/2025    PROT 8.0 04/25/2025    ALBUMIN 4.2 04/25/2025    BILITOT 0.4 04/25/2025    ALKPHOS 74 04/25/2025    AST 17 04/25/2025    ALT 21 04/25/2025    ANIONGAP 10 04/25/2025    WBC 10.23 04/25/2025    HGB 12.6 04/25/2025    HGB 13.6 06/28/2024    HCT 40.3 04/25/2025    MCV 86 04/25/2025     04/25/2025    TSH 1.556 06/28/2024    HEPCAB Non-reactive " "06/28/2024       No results found for: "LH", "FSH", "TOTALTESTOST", "PROGESTERONE", "ESTRADIOL", "XMLEDEEY12AH", "SBYSIVSM04", "FERRITIN", "IRON", "TRANSFERRIN", "TIBC", "FESATURATED", "ZINC"    Objective:   Physical Exam   Physical Exam    General: No acute distress. Well-developed. Well-nourished.+ obesity  Eyes: EOMI. Sclerae anicteric.  HENT: Normocephalic. Atraumatic. Nares patent. Moist oral mucosa.  Ears: Bilateral TMs clear. Bilateral EACs clear.  Cardiovascular: Regular rate. Regular rhythm. No murmurs. No rubs. No gallops. Normal S1, S2.  Respiratory: Normal respiratory effort. Clear to auscultation bilaterally. No rales. No rhonchi. No wheezing.  Abdomen: Soft. Non-tender. Non-distended. Normoactive bowel sounds.  Musculoskeletal: No  obvious deformity.  Extremities: No lower extremity edema.  Neurological: Alert & oriented x3. No slurred speech. Normal gait.  Psychiatric: Normal mood. Normal affect. Good insight. Good judgment.  Skin: Warm. Dry. No rash.         Assessment:     1. Seasonal allergic rhinitis due to pollen    2. Obesity (BMI 30.0-34.9)    3. Borderline low blood pressure determined by examination      Plan:   Assessment & Plan   Seasonal allergic rhinitis due to pollen  - educated counseled patient today advised the patient take Zyrtec or Allegra 1 tablets daily as needed  Obesity (BMI 30.0-34.9)  - educated and counseled patient today for healthy lifestyle modification taking low calorie diet and to do exercise daily as tolerated for overweight reduction  Borderline low blood pressure determined by examination  - today her blood pressure level of 98/57 with pulse rate 77 per minutes.  - asymptomatic  - no further evaluation management necessary     Assessment & Plan    R76.11 Nonspecific reaction to tuberculin skin test without active tuberculosis  J30.9 Allergic rhinitis, unspecified    IMPRESSION:  Reviewed lab results, including titers for various diseases and " "vaccinations.  Interpreted TB test as abnormal, but chest XR showed no active TB infection.  Assessed overall health status based on CMP, noting normal liver and renal function, electrolytes, and absence of diabetes or anemia.  Discussed the clinical insignificance of slight variations in cell size noted in lab results.  Completed tuberculosis screening form based on test results and chest XR.    TUBERCULOSIS (TB) SCREENING AND MANAGEMENT:  - Patient's blood test for TB came back positive, indicating past infection but no current active disease.  - X-ray was performed and results showed no acute problems, confirming absence of active tuberculosis.  - Will complete tuberculosis form for the patient.    ALLERGIC RHINITIS:  - Noted recent onset of allergy symptoms including drainage and sneezing.  - Recommend OTC Zyrtec to be taken as needed for allergy relief.  - Discussed possibility of prescribing Flonase nasal spray if symptoms become severe.    FOLLOW-UP AND OTHER:  - Follow up annually, likely after summer.              Follow up in about 1 year (around 4/30/2026).     There are Patient Instructions on file for this visit.  [unfilled]   All of your core healthy metrics are met.    Patient Education     Health risks of obesity   The Basics   Written by the doctors and editors at [facility]   What is obesity? --   Doctors define obesity based on a person's "body mass index," or "BMI." For adults, weight and height are used to calculate BMI (figure 1).  For people who are White, , or Black:   "Overweight" means a BMI between 25 and 29.9.   "Obesity" means a BMI of 30 or greater.  For people who are , the cutoff numbers are a little different:   "Overweight" means a BMI between 23 and 25.   "Obesity" means a BMI of 25 or greater.  In addition to figuring out your BMI, your doctor might also measure around your belly. This is called "waist circumference." Doctors call it "central obesity" when people " carry extra weight in the belly area, even if their BMI is normal.  Obesity increases the risks of many different health problems. It can also make it harder for you to move, breathe, and do other activities.  What are the health risks of obesity? --   Having obesity increases a person's risk of developing many health problems. Here are just a few examples:   Diabetes   High blood pressure   High cholesterol   Heart disease (including heart attacks)   Stroke   Sleep apnea (a disorder that makes you stop breathing for short periods while asleep)   Asthma   Cancer  Does having obesity shorten a person's life? --   Yes. Studies show that:   People with obesity die younger than people who are a healthy weight.   The risk of death goes up the heavier a person is. The degree of increased risk depends on how long the person has had obesity, and on what other medical problems they have.  People with central obesity (extra weight in the belly area) might also be at risk of dying younger.  Can medical treatments help me lose weight? --   Yes. There are medicines and surgery to help with weight loss. These treatments are meant for people who have not been able to lose weight through lifestyle changes such as diet and exercise.  Weight loss treatments do not take the place of diet and exercise. People who have the treatments must also change how they eat and how active they are.  How can I prevent the problems caused by obesity? --   The best thing you can do is lose weight. But even if weight loss is not possible, you can improve your health and lower your risk if you:   Become more active - Many types of physical activity can help, including walking. You can start with a few minutes a day, and add more as you get stronger and build up your endurance. Anything that gets your body moving is good for you. It is easier to create a habit if you choose activities you enjoy.   Improve your diet - It is healthy to have regular meal  times, eat smaller portions, and not skip meals. Limit sweets, and avoid processed foods. Try to eat more vegetables and fruits instead. The best weight loss plans help you have a healthy view of eating.   Quit smoking (if you smoke) - Some people start eating more after they stop smoking, so try to make healthy food choices. Even if it increases your appetite, quitting smoking is still one of the best things you can do to improve your health.   Limit alcohol - For females of any age, limit alcohol to no more than 1 drink a day. For males 64 and younger, limit alcohol to no more than 2 drinks a day. For males 65 and older, limit alcohol to no more than 1 drink a day.  Keeping a diary might help you reach your daily goals. You can use a paper diary or francisco to help you record:   What you eat and drink   Your physical activity   Your weight  What causes obesity? --   Your genes affect your risk of obesity. But lifestyle also has a big impact. You can develop obesity if you eat too much, eat unhealthy foods, move too little, and watch a lot of TV.  There are other things that seem to increase the risk of obesity that you might not know about. Here are some:   Mother's habits during and after pregnancy - People who eat a lot of calories, have diabetes, or smoke during pregnancy have a higher chance of having babies who have obesity as adults. Also, babies who drink formula might be more likely than  babies to develop obesity later in life.   Habits and weight gain during childhood - Children or teens who are overweight or have obesity are more likely to have obesity as an adult.   Sleeping too little - People who do not get enough sleep are more likely to develop obesity.   Taking certain medicines - Long-term use of certain medicines can cause weight gain. If you are concerned that one of your medicines might be making you gain weight, talk to your doctor or nurse. They might be able to switch you to a different  medicine.   Certain hormonal conditions - Some hormonal problems can increase the risk of developing obesity. For example, hypothyroidism can cause weight gain, along with other symptoms.  What if I want to get pregnant? --   If you are overweight or have obesity, it might be harder to get pregnant. For males, obesity can also cause sex problems, like having trouble getting or keeping an erection. This is more likely if you also have high blood pressure or diabetes.  What if my child has obesity? --   In children, obesity has many of the same risks as it does in adults. For example, it can increase the risk of diabetes, high blood pressure, asthma, and sleep apnea. It can also cause added problems related to childhood. For example, obesity can make children grow faster than normal and cause girls to go through puberty earlier than usual.  When should I call the doctor? --   Call your doctor or nurse if you want to try to lose weight. They can help you do it in a healthy way.  It can also help to work with a dietitian (food and nutrition expert). They can help you choose healthy foods and plan meals.  All topics are updated as new evidence becomes available and our peer review process is complete.  This topic retrieved from [employer] on: Jul 13, 2024.  Topic 46409 Version 20.0  Release: 32.6.2 - C32.193  © 2024 UpToDate, Inc. and/or its affiliates. All rights reserved.  figure 1: Your body mass index (BMI)     Find your height (in feet and inches) in the top row. Then, find your weight (in pounds) in the first column. Now, find where the column for your height and the row for your weight meet. That is your BMI. For example, if you are 5-feet-9-inches tall and you weigh 260 pounds, your BMI is 38.  Graphic 60629 Version 4.0  Consumer Information Use and Disclaimer   Disclaimer: This generalized information is a limited summary of diagnosis, treatment, and/or medication information. It is not meant to be comprehensive  and should be used as a tool to help the user understand and/or assess potential diagnostic and treatment options. It does NOT include all information about conditions, treatments, medications, side effects, or risks that may apply to a specific patient. It is not intended to be medical advice or a substitute for the medical advice, diagnosis, or treatment of a health care provider based on the health care provider's examination and assessment of a patient's specific and unique circumstances. Patients must speak with a health care provider for complete information about their health, medical questions, and treatment options, including any risks or benefits regarding use of medications. This information does not endorse any treatments or medications as safe, effective, or approved for treating a specific patient. UpToDate, Inc. and its affiliates disclaim any warranty or liability relating to this information or the use thereof.The use of this information is governed by the Terms of Use, available at https://www.[employer].skillsbite.com/en/know/clinical-effectiveness-terms. 2024© UpToDate, Inc. and its affiliates and/or licensors. All rights reserved.  Copyright   © 2024 UpToDate, Inc. and/or its affiliates. All rights reserved.  Visit Checklist (as applicable):  1. Status of new and prior symptoms discussed? yes  2. Imaging reviewed/ ordered as appropriate? yes  3. Lab study reviewed/ ordered as appropriate? yes  4. Plan for work-up and treatment discussed with patient? yes  5. Potential medication side-effects and monitoring plan discussed? yes  6. Review of outside medical records was performed and pertinent details are summarized in the HPI above? yes     Time spent on this encounter: 30 minutes. This includes face to face time and non-face to face time preparing to see the patient (eg, review of tests), obtaining and/or reviewing separately obtained history, documenting clinical information in the electronic or other  health record, independently interpreting results (not separately reported) and communicating results to the patient/family/caregiver, or care coordination (not separately reported). Also patient education regarding chronic and acute medical conditions reviewed. Patient handouts given if appropriate.     Each patient to whom he or she provides medical services by telemedicine is:  (1) informed of the relationship between the physician and patient and the respective role of any other health care provider with respect to management of the patient; and (2) notified that he or she may decline to receive medical services by telemedicine and may withdraw from such care at any time.     This note was generated with the assistance of ambient listening technology. Verbal consent was obtained by the patient and accompanying visitor(s) for the recording of patient appointment to facilitate this note. I attest to having reviewed and edited the generated note for accuracy, though some syntax or spelling errors may persist. Please contact the author of this note for any clarification.       Eleazar Gongora MD  [facility]                             [1] No current outpatient medications on file.

## 2025-05-20 ENCOUNTER — PATIENT MESSAGE (OUTPATIENT)
Dept: INTERNAL MEDICINE | Facility: CLINIC | Age: 25
End: 2025-05-20
Payer: COMMERCIAL

## 2025-05-21 NOTE — TELEPHONE ENCOUNTER
Patient need measles and Rubella titers. She will come in on Monday 5/26/25 to have labs drawn and will drop off paperwork to have filled out.

## 2025-05-30 ENCOUNTER — PATIENT MESSAGE (OUTPATIENT)
Dept: INTERNAL MEDICINE | Facility: CLINIC | Age: 25
End: 2025-05-30
Payer: COMMERCIAL

## 2025-06-06 ENCOUNTER — TELEPHONE (OUTPATIENT)
Dept: INTERNAL MEDICINE | Facility: CLINIC | Age: 25
End: 2025-06-06
Payer: COMMERCIAL

## 2025-07-07 ENCOUNTER — PATIENT MESSAGE (OUTPATIENT)
Dept: INTERNAL MEDICINE | Facility: CLINIC | Age: 25
End: 2025-07-07
Payer: COMMERCIAL

## 2025-08-06 ENCOUNTER — PATIENT MESSAGE (OUTPATIENT)
Dept: INTERNAL MEDICINE | Facility: CLINIC | Age: 25
End: 2025-08-06
Payer: COMMERCIAL

## 2025-08-11 ENCOUNTER — TELEPHONE (OUTPATIENT)
Dept: INTERNAL MEDICINE | Facility: CLINIC | Age: 25
End: 2025-08-11
Payer: COMMERCIAL